# Patient Record
Sex: MALE
[De-identification: names, ages, dates, MRNs, and addresses within clinical notes are randomized per-mention and may not be internally consistent; named-entity substitution may affect disease eponyms.]

---

## 2022-09-25 ENCOUNTER — NON-APPOINTMENT (OUTPATIENT)
Age: 57
End: 2022-09-25

## 2022-11-15 ENCOUNTER — NON-APPOINTMENT (OUTPATIENT)
Age: 57
End: 2022-11-15

## 2024-01-23 ENCOUNTER — NON-APPOINTMENT (OUTPATIENT)
Age: 59
End: 2024-01-23

## 2024-04-16 ENCOUNTER — NON-APPOINTMENT (OUTPATIENT)
Age: 59
End: 2024-04-16

## 2024-08-21 ENCOUNTER — NON-APPOINTMENT (OUTPATIENT)
Age: 59
End: 2024-08-21

## 2024-09-24 PROBLEM — Z00.00 ENCOUNTER FOR PREVENTIVE HEALTH EXAMINATION: Status: ACTIVE | Noted: 2024-09-24

## 2024-09-27 ENCOUNTER — NON-APPOINTMENT (OUTPATIENT)
Age: 59
End: 2024-09-27

## 2024-09-27 ENCOUNTER — APPOINTMENT (OUTPATIENT)
Dept: UROLOGY | Facility: CLINIC | Age: 59
End: 2024-09-27
Payer: MEDICARE

## 2024-09-27 VITALS
BODY MASS INDEX: 31.08 KG/M2 | WEIGHT: 222 LBS | OXYGEN SATURATION: 98 % | DIASTOLIC BLOOD PRESSURE: 84 MMHG | TEMPERATURE: 97.2 F | SYSTOLIC BLOOD PRESSURE: 134 MMHG | HEIGHT: 71 IN | HEART RATE: 81 BPM

## 2024-09-27 DIAGNOSIS — Z85.828 PERSONAL HISTORY OF OTHER MALIGNANT NEOPLASM OF SKIN: ICD-10-CM

## 2024-09-27 DIAGNOSIS — N39.41 URGE INCONTINENCE: ICD-10-CM

## 2024-09-27 DIAGNOSIS — N35.919 UNSPECIFIED URETHRAL STRICTURE, MALE, UNSPECIFIED SITE: ICD-10-CM

## 2024-09-27 DIAGNOSIS — Z78.9 OTHER SPECIFIED HEALTH STATUS: ICD-10-CM

## 2024-09-27 DIAGNOSIS — Z21 ASYMPTOMATIC HUMAN IMMUNODEFICIENCY VIRUS [HIV] INFECTION STATUS: ICD-10-CM

## 2024-09-27 DIAGNOSIS — Z87.898 PERSONAL HISTORY OF OTHER SPECIFIED CONDITIONS: ICD-10-CM

## 2024-09-27 DIAGNOSIS — N39.0 URINARY TRACT INFECTION, SITE NOT SPECIFIED: ICD-10-CM

## 2024-09-27 PROCEDURE — 99205 OFFICE O/P NEW HI 60 MIN: CPT

## 2024-09-27 PROCEDURE — 51798 US URINE CAPACITY MEASURE: CPT

## 2024-09-27 RX ORDER — DOLUTEGRAVIR SODIUM AND LAMIVUDINE 50; 300 MG/1; MG/1
50-300 TABLET, FILM COATED ORAL
Refills: 0 | Status: ACTIVE | COMMUNITY

## 2024-09-27 RX ORDER — QUETIAPINE 400 MG/1
400 TABLET, FILM COATED ORAL
Refills: 0 | Status: ACTIVE | COMMUNITY

## 2024-09-27 RX ORDER — SERTRALINE HYDROCHLORIDE 25 MG/1
TABLET, FILM COATED ORAL DAILY
Refills: 0 | Status: ACTIVE | COMMUNITY

## 2024-09-27 RX ORDER — SULFAMETHOXAZOLE AND TRIMETHOPRIM 800; 160 MG/1; MG/1
800-160 TABLET ORAL TWICE DAILY
Qty: 20 | Refills: 0 | Status: ACTIVE | COMMUNITY
Start: 2024-09-27 | End: 1900-01-01

## 2024-09-27 RX ORDER — RILPIVIRINE HYDROCHLORIDE 25 MG/1
25 TABLET, FILM COATED ORAL
Refills: 0 | Status: ACTIVE | COMMUNITY

## 2024-09-27 RX ORDER — TRAZODONE HYDROCHLORIDE 100 MG/1
100 TABLET ORAL
Refills: 0 | Status: ACTIVE | COMMUNITY

## 2024-09-27 RX ORDER — MIRTAZAPINE 15 MG/1
15 TABLET, FILM COATED ORAL
Refills: 0 | Status: ACTIVE | COMMUNITY

## 2024-09-27 NOTE — ASSESSMENT
[FreeTextEntry1] : 58 year old man with long history of LUTS, diagnosed with urethral stricture by Dr. Ptaino at [facility] with unclear length or location, now with worsening urinary symptoms, high PVR >500 cc, overflow incontinence. His creatinine has been reassuringly stable, 1.44 in 9/2024. Will repeat today. Recommended renal ultrasound to rule out hydronephrosis, however, patient declined today. He is very hesitant to have catheter or SPT placed given prior history of urinary retention and self-catheterization. I recommend he follow up with reconstructive urologist, Dr. Sibley, for evaluation and management.   - F/U CMP, UA, UCx  - F/U with Dr. Dillon  - Will likely need RUG and renal ultrasound  - Bactrim prescription sent to pharmacy for self-start given frequent UTIs. Recommend urine cultures when he has symptoms.   - Obtain outside records from Dr. Patino

## 2024-09-27 NOTE — LETTER BODY
[Dear  ___] : Dear  [unfilled], [Courtesy Letter:] : I had the pleasure of seeing your patient, [unfilled], in my office today. [Please see my note below.] : Please see my note below. [Consult Closing:] : Thank you very much for allowing me to participate in the care of this patient.  If you have any questions, please do not hesitate to contact me. [Sincerely,] : Sincerely, [FreeTextEntry3] : Izzy Mcdonald MD

## 2024-09-27 NOTE — HISTORY OF PRESENT ILLNESS
[FreeTextEntry1] : Mr. Esteban is a 58-year-old male with a history of urinary symptoms including a weak stream, difficulty urinating, needing to strain to urinate, and incomplete bladder emptying. Most bothersome is urge incontinence and frequent incontinence overnight. He has frequent UTIs, culture proven, with frequent antibiotics use. He has been experiencing these symptoms for at least the past three years with gradual worsening.  He saw Dr. Patino a few years ago. Found to have high PVR. Catheter placement was attempted, but unsuccessful due to stricture. Unclear length or location of stricture. Urethral dilation or urethroplasty was planned, but never performed due to several cancellations.  He has a history of chronic benzodiazepine abuse which led to urinary retention 11 years ago. He needed to self-catheterize for several months after the initial episode. He was eventually able to urinate with difficulty. Mr. Esteban has been sober for over 10 years with improvement in urinary retention after stopping benzo use.   PVR today 580 cc. I recommended renal ultrasound, which patient declined understanding potential risk for renal injury or renal failure.  Recent creatinine has been stable around 1.5 in 9/2024 PSA 9/2024 0.4

## 2024-10-03 LAB
ALBUMIN SERPL ELPH-MCNC: 4.9 G/DL
ALP BLD-CCNC: 100 U/L
ALT SERPL-CCNC: 34 U/L
ANION GAP SERPL CALC-SCNC: 14 MMOL/L
APPEARANCE: CLEAR
AST SERPL-CCNC: 48 U/L
BACTERIA UR CULT: NORMAL
BACTERIA: NEGATIVE /HPF
BILIRUB SERPL-MCNC: 0.4 MG/DL
BILIRUBIN URINE: NEGATIVE
BLOOD URINE: NEGATIVE
BUN SERPL-MCNC: 20 MG/DL
CALCIUM SERPL-MCNC: 9.2 MG/DL
CAST: 0 /LPF
CHLORIDE SERPL-SCNC: 102 MMOL/L
CO2 SERPL-SCNC: 21 MMOL/L
COLOR: YELLOW
CREAT SERPL-MCNC: 1.52 MG/DL
EGFR: 52 ML/MIN/1.73M2
EPITHELIAL CELLS: 0 /HPF
GLUCOSE QUALITATIVE U: NEGATIVE MG/DL
GLUCOSE SERPL-MCNC: 94 MG/DL
KETONES URINE: NEGATIVE MG/DL
LEUKOCYTE ESTERASE URINE: ABNORMAL
MICROSCOPIC-UA: NORMAL
NITRITE URINE: NEGATIVE
PH URINE: 6
POTASSIUM SERPL-SCNC: 4.9 MMOL/L
PROT SERPL-MCNC: 7.7 G/DL
PROTEIN URINE: NEGATIVE MG/DL
RED BLOOD CELLS URINE: 0 /HPF
SODIUM SERPL-SCNC: 138 MMOL/L
SPECIFIC GRAVITY URINE: 1.01
UROBILINOGEN URINE: 0.2 MG/DL
WHITE BLOOD CELLS URINE: 1 /HPF

## 2024-10-14 ENCOUNTER — APPOINTMENT (OUTPATIENT)
Dept: UROLOGY | Facility: CLINIC | Age: 59
End: 2024-10-14
Payer: MEDICARE

## 2024-10-14 VITALS
SYSTOLIC BLOOD PRESSURE: 119 MMHG | WEIGHT: 222 LBS | DIASTOLIC BLOOD PRESSURE: 79 MMHG | BODY MASS INDEX: 31.08 KG/M2 | HEIGHT: 71 IN | TEMPERATURE: 97.6 F | OXYGEN SATURATION: 97 % | HEART RATE: 72 BPM

## 2024-10-14 PROCEDURE — 99214 OFFICE O/P EST MOD 30 MIN: CPT

## 2024-10-14 PROCEDURE — G2211 COMPLEX E/M VISIT ADD ON: CPT

## 2024-10-21 ENCOUNTER — APPOINTMENT (OUTPATIENT)
Dept: UROLOGY | Facility: CLINIC | Age: 59
End: 2024-10-21

## 2024-10-21 VITALS
BODY MASS INDEX: 31.5 KG/M2 | SYSTOLIC BLOOD PRESSURE: 111 MMHG | HEIGHT: 71 IN | HEART RATE: 75 BPM | WEIGHT: 225 LBS | TEMPERATURE: 97.3 F | DIASTOLIC BLOOD PRESSURE: 73 MMHG | OXYGEN SATURATION: 97 %

## 2024-10-21 LAB
APPEARANCE: CLEAR
BILIRUBIN URINE: NEGATIVE
BLOOD URINE: NEGATIVE
COLOR: YELLOW
GLUCOSE QUALITATIVE U: NEGATIVE MG/DL
KETONES URINE: NEGATIVE MG/DL
LEUKOCYTE ESTERASE URINE: NEGATIVE
NITRITE URINE: NEGATIVE
PH URINE: 6.5
PROTEIN URINE: NEGATIVE MG/DL
SPECIFIC GRAVITY URINE: 1.01
UROBILINOGEN URINE: 0.2 MG/DL

## 2024-10-21 PROCEDURE — 51610 INJECTION FOR BLADDER X-RAY: CPT

## 2024-10-21 PROCEDURE — 52000 CYSTOURETHROSCOPY: CPT

## 2024-10-21 PROCEDURE — 99215 OFFICE O/P EST HI 40 MIN: CPT | Mod: 25

## 2024-10-21 PROCEDURE — 74450 X-RAY URETHRA/BLADDER: CPT

## 2024-10-21 RX ORDER — SULFAMETHOXAZOLE AND TRIMETHOPRIM 800; 160 MG/1; MG/1
800-160 TABLET ORAL
Qty: 14 | Refills: 0 | Status: ACTIVE | COMMUNITY
Start: 2024-10-21 | End: 1900-01-01

## 2024-12-09 NOTE — ASU PATIENT PROFILE, ADULT - NS PREOP UNDERSTANDS INFO
No solid food/dairy/candy/gum after 7:30am tomorrow; water allowed before 13:30 tomorrow; patient reminded to come with photo ID/insurance/credit card; dress in comfortable clothes; no jewelries/contact lens/valuable; no smoking/alcohol drinking/recreational drug use today; escort to have photo ID; address and callback number was given/yes

## 2024-12-09 NOTE — ASU PATIENT PROFILE, ADULT - NSICDXPASTSURGICALHX_GEN_ALL_CORE_FT
PAST SURGICAL HISTORY:  Blood clots in brain     H/O basal cell carcinoma excision     H/O brain surgery

## 2024-12-10 ENCOUNTER — OUTPATIENT (OUTPATIENT)
Dept: OUTPATIENT SERVICES | Facility: HOSPITAL | Age: 59
LOS: 1 days | Discharge: HOME | End: 2024-12-10

## 2024-12-10 ENCOUNTER — TRANSCRIPTION ENCOUNTER (OUTPATIENT)
Age: 59
End: 2024-12-10

## 2024-12-10 ENCOUNTER — RESULT REVIEW (OUTPATIENT)
Age: 59
End: 2024-12-10

## 2024-12-10 ENCOUNTER — APPOINTMENT (OUTPATIENT)
Dept: UROLOGY | Facility: AMBULATORY SURGERY CENTER | Age: 59
End: 2024-12-10

## 2024-12-10 VITALS
TEMPERATURE: 98 F | RESPIRATION RATE: 20 BRPM | OXYGEN SATURATION: 96 % | HEIGHT: 71 IN | SYSTOLIC BLOOD PRESSURE: 124 MMHG | DIASTOLIC BLOOD PRESSURE: 71 MMHG | HEART RATE: 72 BPM

## 2024-12-10 VITALS
SYSTOLIC BLOOD PRESSURE: 120 MMHG | OXYGEN SATURATION: 97 % | DIASTOLIC BLOOD PRESSURE: 87 MMHG | HEART RATE: 80 BPM | RESPIRATION RATE: 15 BRPM

## 2024-12-10 DIAGNOSIS — Z98.890 OTHER SPECIFIED POSTPROCEDURAL STATES: Chronic | ICD-10-CM

## 2024-12-10 DIAGNOSIS — I66.9 OCCLUSION AND STENOSIS OF UNSPECIFIED CEREBRAL ARTERY: Chronic | ICD-10-CM

## 2024-12-10 DIAGNOSIS — N35.919 UNSPECIFIED URETHRAL STRICTURE, MALE, UNSPECIFIED SITE: ICD-10-CM

## 2024-12-10 PROCEDURE — 88305 TISSUE EXAM BY PATHOLOGIST: CPT | Mod: 26

## 2024-12-10 PROCEDURE — 53410 RECONSTRUCTION OF URETHRA: CPT

## 2024-12-10 RX ORDER — NITROFURANTOIN MACROCRYSTALS 100 MG/1
100 CAPSULE ORAL DAILY
Qty: 30 | Refills: 0 | Status: ACTIVE | COMMUNITY
Start: 2024-12-10 | End: 1900-01-01

## 2024-12-10 RX ORDER — OXYBUTYNIN CHLORIDE 5 MG/1
5 TABLET, EXTENDED RELEASE ORAL
Qty: 30 | Refills: 0 | Status: ACTIVE | COMMUNITY
Start: 2024-12-10 | End: 1900-01-01

## 2024-12-10 RX ORDER — MELOXICAM 7.5 MG/1
7.5 TABLET ORAL DAILY
Qty: 60 | Refills: 0 | Status: ACTIVE | COMMUNITY
Start: 2024-12-10 | End: 1900-01-01

## 2024-12-10 RX ORDER — FENTANYL 12 UG/H
25 PATCH, EXTENDED RELEASE TRANSDERMAL
Refills: 0 | Status: DISCONTINUED | OUTPATIENT
Start: 2024-12-10 | End: 2024-12-10

## 2024-12-10 RX ORDER — ACETAMINOPHEN 500 MG/1
500 TABLET ORAL EVERY 6 HOURS
Qty: 240 | Refills: 0 | Status: ACTIVE | COMMUNITY
Start: 2024-12-10 | End: 1900-01-01

## 2024-12-10 RX ORDER — 0.9 % SODIUM CHLORIDE 0.9 %
1000 INTRAVENOUS SOLUTION INTRAVENOUS
Refills: 0 | Status: DISCONTINUED | OUTPATIENT
Start: 2024-12-10 | End: 2024-12-10

## 2024-12-10 RX ORDER — MIRTAZAPINE 15 MG/1
1 TABLET, FILM COATED ORAL
Refills: 0 | DISCHARGE

## 2024-12-10 RX ORDER — HEPARIN SODIUM,PORCINE 1000/ML
5000 VIAL (ML) INJECTION ONCE
Refills: 0 | Status: COMPLETED | OUTPATIENT
Start: 2024-12-10 | End: 2024-12-10

## 2024-12-10 RX ORDER — SERTRALINE HYDROCHLORIDE 100 MG/1
1 TABLET, FILM COATED ORAL
Refills: 0 | DISCHARGE

## 2024-12-10 RX ORDER — DOLUTEGRAVIR SODIUM AND LAMIVUDINE 50; 300 MG/1; MG/1
1 TABLET, FILM COATED ORAL
Refills: 0 | DISCHARGE

## 2024-12-10 RX ORDER — APREPITANT 40 MG/1
40 CAPSULE ORAL ONCE
Refills: 0 | Status: COMPLETED | OUTPATIENT
Start: 2024-12-10 | End: 2024-12-10

## 2024-12-10 RX ORDER — RILPIVIRINE HYDROCHLORIDE 25 MG/1
1 TABLET, FILM COATED ORAL
Refills: 0 | DISCHARGE

## 2024-12-10 RX ORDER — ACETAMINOPHEN 500MG 500 MG/1
1000 TABLET, COATED ORAL ONCE
Refills: 0 | Status: COMPLETED | OUTPATIENT
Start: 2024-12-10 | End: 2024-12-10

## 2024-12-10 RX ORDER — TRAZODONE HYDROCHLORIDE 150 MG/1
0 TABLET ORAL
Refills: 0 | DISCHARGE

## 2024-12-10 RX ORDER — CELECOXIB 200 MG/1
100 CAPSULE ORAL ONCE
Refills: 0 | Status: COMPLETED | OUTPATIENT
Start: 2024-12-10 | End: 2024-12-10

## 2024-12-10 RX ORDER — DOCUSATE SODIUM 100 MG/1
100 CAPSULE, LIQUID FILLED ORAL TWICE DAILY
Qty: 60 | Refills: 0 | Status: ACTIVE | COMMUNITY
Start: 2024-12-10 | End: 1900-01-01

## 2024-12-10 RX ADMIN — ACETAMINOPHEN 500MG 1000 MILLIGRAM(S): 500 TABLET, COATED ORAL at 15:27

## 2024-12-10 RX ADMIN — APREPITANT 40 MILLIGRAM(S): 40 CAPSULE ORAL at 15:26

## 2024-12-10 RX ADMIN — CELECOXIB 100 MILLIGRAM(S): 200 CAPSULE ORAL at 15:27

## 2024-12-10 RX ADMIN — Medication 5000 UNIT(S): at 15:28

## 2024-12-10 NOTE — ASU DISCHARGE PLAN (ADULT/PEDIATRIC) - PROCEDURE
Cystoscopy, single stage urethroplasty Cystoscopy, single stage urethroplasty through perineal approach

## 2024-12-10 NOTE — ASU DISCHARGE PLAN (ADULT/PEDIATRIC) - ASU DC SPECIAL INSTRUCTIONSFT
URETHROPLASTY    SURGICAL WOUND: There are often lumps and bumps that can be felt in the scrotum on either or both sides up to two (2) months or more post operatively. These are of no concern and with time they will soften and disappear.  Any “black and blue” bruising areas will also resolve.  Normally, there is also swelling of the scrotum post operatively. Sometimes the tissue fluid which causes the swelling migrates to the penile skin and can look alarming; with time, all the swelling will eventually subside but may take weeks.  A scrotal support and scrotal fluffs should be worn at all times for the next few weeks, unless bathing, to minimize this swelling. You may apply an ice-pack for 15 minutes out of every hour for the first 24 -36 hours to minimize pain and swelling.    STITCHES: The stitches in the incision will dissolve and fall out by themselves. Sometimes skin stitches may open, allowing a slight gaping of the incision. This is no problem if you keep the area clean.  There is a bluish colored waterproof glue over the incision as well – the glue will peel away and fall off on its own over a couple of weeks.      DRAIN: Some patients are sent home with a drain. A drain continuously drains the surgical wound and is expected to fill with blood colored fluid. If you have a drain, the nurses will review instructions and care before you go home. Follow up in the office within the next few days for drain removal.    PAIN: You may have some intermittent pain for up to six (6) weeks post operatively. Pain does not signify any problem unless associated with fever, chills, or inability to void.  If you experience any fevers or chills please call immediately as this may be signs of an infection. For pain, please take Tylenol (acetaminophen) 1000 mg every 6 hours and meloxicam 7.5mg daily for a month. Please take oxybuytnin 5 mg ER as needed for bladder spasms. To prevent constipation, take colace 100mg twice daily and stop for loose stools for a month.     ANTIBIOTICS: Please take nitrofurantoin for antibiotics 100 mg twice daily for a month.    STOOL SOFTENERS: Do not allow yourself to become constipated as straining may cause bleeding. Please take Colace 100 mg twice daily and stoop for loose stools. If you are still constipated, you can take additional stool softners or laxatives available over the counter (ex. Miralax, Senokot, ExLax, etc), available over the counter    ANTICOAGULATION: If you are taking any blood thinning medications, please discuss with your urologist prior to restarting these medications unless otherwise specified.    BATHING: You may sponge bath 24 hours after surgery, but minimize water to the surgical incision and drain.    DIET: You may resume your regular diet and regular medication regimen.    ACTIVITY: No heavy lifting or strenuous exercise until you are evaluated at your post-operative appointment. Otherwise, you may return to your usual level of physical activity.    FOLLOW-UP: If you did not already schedule your post-operative appointment, please call your urologist to schedule and follow-up appointment.    CALL YOUR UROLOGIST IF: You have any bleeding that does not stop, inability to void >8 hours, fever over 100.4 F, chills, persistent nausea/vomiting, changes in your incision concerning for infection, or if your pain is not controlled on your discharge pain medications. URETHROPLASTY    SURGICAL WOUND: There are often lumps and bumps that can be felt in the scrotum on either or both sides up to two (2) months or more post operatively. These are of no concern and with time they will soften and disappear.  Any “black and blue” bruising areas will also resolve.  Normally, there is also swelling of the scrotum post operatively. Sometimes the tissue fluid which causes the swelling migrates to the penile skin and can look alarming; with time, all the swelling will eventually subside but may take weeks.     STITCHES: The stitches in the incision will dissolve and fall out by themselves. Sometimes skin stitches may open, allowing a slight gaping of the incision. This is no problem if you keep the area clean.      PAIN: You may have some intermittent pain for up to six (6) weeks post operatively. Pain does not signify any problem unless associated with fever, chills, or inability to void.  If you experience any fevers or chills please call immediately as this may be signs of an infection. For pain, please take Tylenol (acetaminophen) 1000 mg every 6 hours and meloxicam 7.5mg daily once daily for a week then as needed for pain. Please take oxybuytnin 5 mg ER as needed for bladder spasms, stop taking it a day before catheter removal. To prevent constipation, take colace 100mg twice daily and stop for loose stools for a month.     ANTIBIOTICS: Please take nitrofurantoin for antibiotics 100 mg daily until jolly is removed    STOOL SOFTENERS: Do not allow yourself to become constipated as straining may cause bleeding. Please take Colace 100 mg twice daily and stoop for loose stools. If you are still constipated, you can take additional stool softners or laxatives available over the counter (ex. Miralax, Senokot, ExLax, etc), available over the counter    ANTICOAGULATION: If you are taking any blood thinning medications, please discuss with your urologist prior to restarting these medications unless otherwise specified.    BATHING: You may sponge bath 24 hours after surgery, but minimize water to the surgical incision    DIET: You may resume your regular diet and regular medication regimen.    ACTIVITY: No heavy lifting or strenuous exercise until you are evaluated at your post-operative appointment. Otherwise, you may return to your usual level of physical activity.    FOLLOW-UP: If you did not already schedule your post-operative appointment, please call your urologist to schedule and follow-up appointment.    CALL YOUR UROLOGIST IF: You have any bleeding that does not stop, inability to void >8 hours, fever over 100.4 F, chills, persistent nausea/vomiting, changes in your incision concerning for infection, or if your pain is not controlled on your discharge pain medications.

## 2024-12-10 NOTE — ASU DISCHARGE PLAN (ADULT/PEDIATRIC) - PROVIDER TOKENS
PROVIDER:[TOKEN:[[id number]],FOLLOWUP:[1 month]] PROVIDER:[TOKEN:[[id number]],SCHEDULEDAPPT:[12/23/2024]]

## 2024-12-10 NOTE — ASU DISCHARGE PLAN (ADULT/PEDIATRIC) - FINANCIAL ASSISTANCE
[facility] provides services at a reduced cost to those who are determined to be eligible through [facility]’s financial assistance program. Information regarding [facility]’s financial assistance program can be found by going to [email or web address] or by calling [phone].

## 2024-12-10 NOTE — BRIEF OPERATIVE NOTE - OPERATION/FINDINGS
about 1 cm bulbar stricture EPA through perineal incision and approach. Closed with sutures, local given. jolly placed at end of case

## 2024-12-10 NOTE — ASU DISCHARGE PLAN (ADULT/PEDIATRIC) - PAIN MANAGEMENT
Take over the counter pain medication/Prescriptions electronically submitted to pharmacy from [facility] Take over the counter pain medication/Prescriptions electronically submitted to pharmacy from doctor's office

## 2024-12-10 NOTE — ASU DISCHARGE PLAN (ADULT/PEDIATRIC) - CARE PROVIDER_API CALL
Austin Dillon  Urology  [facility] [place] [address]  Phone: [phone]  Fax: [phone]  Follow Up Time: 1 month   Austin Dillon  Urology  [address] [place] [address]  Phone: [phone]  Fax: [phone]  Scheduled Appointment: 12/23/2024

## 2024-12-10 NOTE — PRE-ANESTHESIA EVALUATION ADULT - NSANTHOSAYNRD_GEN_A_CORE
No. RIA screening performed.  STOP BANG Legend: 0-2 = LOW Risk; 3-4 = INTERMEDIATE Risk; 5-8 = HIGH Risk

## 2024-12-11 PROBLEM — B20 HUMAN IMMUNODEFICIENCY VIRUS [HIV] DISEASE: Chronic | Status: ACTIVE | Noted: 2024-12-10

## 2024-12-23 ENCOUNTER — APPOINTMENT (OUTPATIENT)
Dept: UROLOGY | Facility: CLINIC | Age: 59
End: 2024-12-23

## 2024-12-23 ENCOUNTER — RX RENEWAL (OUTPATIENT)
Age: 59
End: 2024-12-23

## 2024-12-23 VITALS — SYSTOLIC BLOOD PRESSURE: 133 MMHG | HEART RATE: 63 BPM | OXYGEN SATURATION: 98 % | DIASTOLIC BLOOD PRESSURE: 84 MMHG

## 2024-12-23 PROCEDURE — 74450 X-RAY URETHRA/BLADDER: CPT

## 2024-12-23 PROCEDURE — 99024 POSTOP FOLLOW-UP VISIT: CPT

## 2024-12-23 PROCEDURE — 51610 INJECTION FOR BLADDER X-RAY: CPT

## 2024-12-23 RX ORDER — NITROFURANTOIN (MONOHYDRATE/MACROCRYSTALS) 25; 75 MG/1; MG/1
100 CAPSULE ORAL
Qty: 30 | Refills: 0 | Status: ACTIVE | COMMUNITY
Start: 2024-12-10 | End: 1900-01-01

## 2024-12-24 PROBLEM — I66.9 OCCLUSION AND STENOSIS OF UNSPECIFIED CEREBRAL ARTERY: Chronic | Status: ACTIVE | Noted: 2024-12-10

## 2025-01-07 ENCOUNTER — RX RENEWAL (OUTPATIENT)
Age: 60
End: 2025-01-07

## 2025-03-18 NOTE — PRE-ANESTHESIA EVALUATION ADULT - NSANTHBMIRD_ENT_A_CORE
No Detail Level: Zone Render Risk Assessment In Note?: yes Recommendations (Free Text): Recommended evaluation and treatment with Dr. Hurtado

## 2025-03-21 ENCOUNTER — APPOINTMENT (OUTPATIENT)
Dept: UROLOGY | Facility: CLINIC | Age: 60
End: 2025-03-21

## 2025-04-14 ENCOUNTER — APPOINTMENT (OUTPATIENT)
Dept: UROLOGY | Facility: CLINIC | Age: 60
End: 2025-04-14

## 2025-05-21 ENCOUNTER — APPOINTMENT (OUTPATIENT)
Dept: UROLOGY | Facility: CLINIC | Age: 60
End: 2025-05-21

## 2025-07-18 ENCOUNTER — APPOINTMENT (OUTPATIENT)
Dept: UROLOGY | Facility: CLINIC | Age: 60
End: 2025-07-18

## 2025-07-23 ENCOUNTER — APPOINTMENT (OUTPATIENT)
Dept: UROLOGY | Facility: CLINIC | Age: 60
End: 2025-07-23

## (undated) DEVICE — VENODYNE/SCD SLEEVE CALF MEDIUM

## (undated) DEVICE — PACK GENERAL MINOR

## (undated) DEVICE — VISITEC 4X4

## (undated) DEVICE — MARKING PEN W RULER

## (undated) DEVICE — DRAPE TOWEL BLUE 17" X 24"

## (undated) DEVICE — ELCTR BIPOLAR CORD 12FT

## (undated) DEVICE — ELCTR BOVIE TIP BLADE INSULATED 2.8" EDGE WITH SAFETY

## (undated) DEVICE — BOSTON SCIENTIFIC UROLOK II SCOPE ADAPTOR

## (undated) DEVICE — LONE STAR RETRACTOR RING 32.5CM X 18.3CM DISP

## (undated) DEVICE — SUT SILK 2-0 30" SH

## (undated) DEVICE — ELCTR ECG CONDUCTIVE ADHESIVE

## (undated) DEVICE — SYR ASEPTO

## (undated) DEVICE — DRAIN URINARY LEG BAG WITH FLIP-FLO VALVE 32OZ

## (undated) DEVICE — PACK CYSTO

## (undated) DEVICE — DRSG TEGADERM 4 X 4.75"

## (undated) DEVICE — FOLEY HOLDER STATLOCK 2 WAY ADULT

## (undated) DEVICE — LONE STAR ELASTIC STAY HOOK 12MM BLUNT

## (undated) DEVICE — VESSEL LOOP EXTRA MAXI-BLUE 0.200" X 22"

## (undated) DEVICE — GLV 7.5 PROTEXIS (WHITE)

## (undated) DEVICE — TUBING SUCTION NONCONDUCTIVE 6MM X 12FT

## (undated) DEVICE — LUBRICATING JELLY ONESHOT 1.25OZ

## (undated) DEVICE — SUT VICRYL 4-0 27" RB-1 UNDYED

## (undated) DEVICE — FOLEY CATH 2-WAY 16FR 5CC SILICONE ELASTOMER LATEX

## (undated) DEVICE — SOL INJ NS 0.9% 1000ML

## (undated) DEVICE — PREP CHLORAPREP HI-LITE ORANGE 26ML

## (undated) DEVICE — DRAINAGE BAG URINARY 2L

## (undated) DEVICE — DRAPE LAVH 124" X 30" X125"

## (undated) DEVICE — DRSG TELFA 3 X 8

## (undated) DEVICE — SUT PDS PLUS 5-0 30" RB-2 DA

## (undated) DEVICE — PRESSURE INFUSOR BAG 3000ML

## (undated) DEVICE — GLV 7 PROTEXIS (WHITE)

## (undated) DEVICE — FRAZIER SUCTION TIP 8FR

## (undated) DEVICE — BLADE SURGICAL #10 CARBON

## (undated) DEVICE — POSITIONER FOAM EGG CRATE ULNAR 2PCS (PINK)

## (undated) DEVICE — STAPLER SKIN PROXIMATE

## (undated) DEVICE — ELCTR PENCIL SMOKE EVACUATOR COATED PUSH BUTTON 70MM

## (undated) DEVICE — PREP BETADINE KIT

## (undated) DEVICE — NDL COUNTER FOAM AND MAGNET 20-40

## (undated) DEVICE — TUBING TUR 2 PRONG